# Patient Record
Sex: MALE | Race: ASIAN | NOT HISPANIC OR LATINO | Employment: FULL TIME | URBAN - METROPOLITAN AREA
[De-identification: names, ages, dates, MRNs, and addresses within clinical notes are randomized per-mention and may not be internally consistent; named-entity substitution may affect disease eponyms.]

---

## 2023-04-07 NOTE — TELEPHONE ENCOUNTER
Action    Action Taken Tried to reach patient to get urgent care information- Phone number not in service 23- [place]    Sent request to [facility] to see if they happen to have recs    PER [facility] Not their patient         MEDICAL RECORDS REQUEST   [facility]  Urology Clinic  [address] [place] [id number]  PHONE: [phone]  Fax: [phone]        FUTURE VISIT INFORMATION                                                   Ilan Hinds, : 1980 scheduled for future visit at [facility]    APPOINTMENT INFORMATION:    Date: 23    Provider:  Ana M Martinez    Reason for Visit/Diagnosis: testicular pain, scheduled per patient. Urgent Care clinic in [place] on 23      RECORDS REQUESTED FOR VISIT                                                     NOTES  STATUS/DETAILS   OFFICE NOTE from other specialist  in process   DISCHARGE SUMMARY from hospital  in process   DISCHARGE REPORT from the ER  in process   OPERATIVE REPORT  in process   MEDICATION LIST  in process   LABS     URINALYSIS (UA)  in process   URINE CYTOLOGY  in process     PRE-VISIT CHECKLIST      No- in process

## 2023-04-20 ENCOUNTER — PRE VISIT (OUTPATIENT)
Dept: UROLOGY | Facility: CLINIC | Age: 43
End: 2023-04-20
Payer: COMMERCIAL

## 2023-04-20 NOTE — PROGRESS NOTES
Subjective     REQUESTING PROVIDER  Self-referral    REASON FOR VISIT  Testicular pain    HISTORY OF PRESENT ILLNESS  Mr. Hinds is a pleasant 42 year old male with no significant past medical history who presents today for testicular pain.     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, congenital, no history of surgery on it, that is the painful side, no pain on left     Pain started a little over 10 years ago     Able to have children    Now would like to pursue surgery given increase in pain    No history of gross hematuria     No history of kidney stones    No history of retention    No history of UTIs or testicular infections    No history of genital/abdominal surgery    States that pain can be associated with urge to defecate, pain gets a bit better    Pain is made better by squatting    Has not tried any medications    REVIEW OF SYSTEMS  Review of Systems   Constitutional: Positive for fatigue. Negative for chills, diaphoresis and unexpected weight change.        No night sweats   HENT: Negative for ear pain and tinnitus.    Eyes: Negative for visual disturbance.   Respiratory: Negative for shortness of breath.    Cardiovascular: Negative for chest pain.   Gastrointestinal: Negative for abdominal pain, constipation and diarrhea.   Genitourinary: Negative for hematuria.   Musculoskeletal: Negative for back pain.   Neurological: Negative for dizziness and light-headedness.   Hematological: Negative for adenopathy.   Psychiatric/Behavioral: Negative for sleep disturbance.     Social History:  Very minimal, one cigarette when stressed  Occasional weed gummie    Family History:  Denies any known family history of urologic malignancy     Objective     PHYSICAL EXAM  Physical Exam  Constitutional:       Appearance: Normal appearance.   HENT:      Head: Normocephalic and atraumatic.      Right Ear: External ear normal.      Left Ear: External ear normal.       Nose: Nose normal.   Eyes:      General:         Right eye: No discharge.         Left eye: No discharge.   Pulmonary:      Effort: Pulmonary effort is normal. No respiratory distress.   Abdominal:      General: There is no distension.   Genitourinary:     Comments: Circumcised phallus without any evidence of meatal stenosis, penile plaque, or abnormal lesion.    Left testicle within the scrotum without any evidence of nodularity, induration, or tenderness.  Spermatic cord palpated with intact vas deferens and no evidence of varicocele.  Right testicle absent from the scrotum, nonpalpable in the inguinal canal.  Tenderness to palpation of the suprapubic area on the right-hand side.  No evidence of scars in the suprapubic area.  Musculoskeletal:         General: No deformity.   Neurological:      Mental Status: He is alert and oriented to person, place, and time.   Psychiatric:         Mood and Affect: Mood normal.       LABORATORY  No recent urine testing    IMAGING  No testicular ultrasound     Assessment & Plan   1. Right testicular pain  2. Right abdominal testicle    It was my pleasure to meet with Mr. Hinds in the office today in regards to his undistended right testicle and pain in that testicle.  After reviewing his clinical situation as well as his physical exam, I believe that it is warranted for us to get some updated imaging of his abdominal testicle as well as tumor markers.  We discussed that this is because undistended testicles are at an elevated risk for testicular cancer, and that we would need updated imaging before we could proceed with any discussion of surgery anyways.  I did stress that if his testicular pain on the right-hand side and his abdomen were to get worse, he should go to the emergency department for a sooner scan and to rule out testicular torsion within the abdomen.    Outside of this, we will plan to get a CT pelvis as well as AFP, hCG, and LDH.  We will plan to speak with him  virtually or in the office shortly after these was tests, as well as get him on the calendar with Dr. Barajas as he is very interested in completing surgery, specifically orchiectomy.    Mr. Hinds expressed understanding and agreement to the above discussion and plan and all of his questions were answered to his satisfaction. A business card was provided as a point of contact.    PLAN    First available pelvic CT with and without IV contrast as well as tumor markers with virtual visit or office visit shortly afterwards to discuss results    Schedule first available visit with Dr. Barajas for discussion of orchiectomy in the setting of abdominal testicle and testicular pain    Signed by:    Stoney Martinez PA-C

## 2023-04-20 NOTE — CONFIDENTIAL NOTE
Reason for visit: consult     Relevant information: testicular pain    Records/imaging/labs/orders: all in process, unable to reach pt for records    At Rooming: kai Campa  4/20/2023  1:47 PM

## 2023-04-24 ENCOUNTER — OFFICE VISIT (OUTPATIENT)
Dept: UROLOGY | Facility: CLINIC | Age: 43
End: 2023-04-24
Payer: COMMERCIAL

## 2023-04-24 ENCOUNTER — PRE VISIT (OUTPATIENT)
Dept: UROLOGY | Facility: CLINIC | Age: 43
End: 2023-04-24

## 2023-04-24 VITALS
HEART RATE: 84 BPM | DIASTOLIC BLOOD PRESSURE: 92 MMHG | SYSTOLIC BLOOD PRESSURE: 148 MMHG | BODY MASS INDEX: 25.18 KG/M2 | WEIGHT: 190 LBS | HEIGHT: 73 IN

## 2023-04-24 DIAGNOSIS — Q53.111 UNILATERAL INTRA-ABDOMINAL TESTIS: Primary | ICD-10-CM

## 2023-04-24 PROCEDURE — 99204 OFFICE O/P NEW MOD 45 MIN: CPT | Performed by: STUDENT IN AN ORGANIZED HEALTH CARE EDUCATION/TRAINING PROGRAM

## 2023-04-24 ASSESSMENT — ENCOUNTER SYMPTOMS
SLEEP DISTURBANCE: 0
ABDOMINAL PAIN: 0
SHORTNESS OF BREATH: 0
UNEXPECTED WEIGHT CHANGE: 0
LIGHT-HEADEDNESS: 0
HEMATURIA: 0
CONSTIPATION: 0
DIARRHEA: 0
BACK PAIN: 0
FATIGUE: 1
ADENOPATHY: 0
CHILLS: 0
DIZZINESS: 0
DIAPHORESIS: 0

## 2023-04-24 ASSESSMENT — PAIN SCALES - GENERAL: PAINLEVEL: MODERATE PAIN (5)

## 2023-04-24 NOTE — NURSING NOTE
"Chief Complaint   Patient presents with     Consult     Testicular pain       Blood pressure (!) 148/92, pulse 84, height 1.854 m (6' 1\"), weight 86.2 kg (190 lb). Body mass index is 25.07 kg/m .    Patient Active Problem List   Diagnosis     CARDIOVASCULAR SCREENING; LDL GOAL LESS THAN 160       No Known Allergies    Current Outpatient Medications   Medication Sig Dispense Refill     NO ACTIVE MEDICATIONS          Social History     Tobacco Use     Smoking status: Some Days     Types: Cigarettes   Substance Use Topics     Alcohol use: Yes     Alcohol/week: 3.3 - 4.2 standard drinks of alcohol     Types: 4 - 5 drink(s) per week     Drug use: No       Nanci Chau LPN  4/24/2023  9:48 AM  "

## 2023-04-24 NOTE — LETTER
4/24/2023       RE: Ilan Hinds  [address] [facility] [address]     Dear Colleague,    Thank you for referring your patient, Ilan Hinds, to the [facility] [place] at [facility]. Please see a copy of my visit note below.    Subjective     REQUESTING PROVIDER  Self-referral    REASON FOR VISIT  Testicular pain    HISTORY OF PRESENT ILLNESS  Mr. Hinds is a pleasant 42 year old male with no significant past medical history who presents today for testicular pain.     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, congenital, no history of surgery on it, that is the painful side, no pain on left   Pain started a little over 10 years ago   Able to have children  Now would like to pursue surgery given increase in pain  No history of gross hematuria   No history of kidney stones  No history of retention  No history of UTIs or testicular infections  No history of genital/abdominal surgery  States that pain can be associated with urge to defecate, pain gets a bit better  Pain is made better by squatting  Has not tried any medications    REVIEW OF SYSTEMS  Review of Systems   Constitutional: Positive for fatigue. Negative for chills, diaphoresis and unexpected weight change.        No night sweats   HENT: Negative for ear pain and tinnitus.    Eyes: Negative for visual disturbance.   Respiratory: Negative for shortness of breath.    Cardiovascular: Negative for chest pain.   Gastrointestinal: Negative for abdominal pain, constipation and diarrhea.   Genitourinary: Negative for hematuria.   Musculoskeletal: Negative for back pain.   Neurological: Negative for dizziness and light-headedness.   Hematological: Negative for adenopathy.   Psychiatric/Behavioral: Negative for sleep disturbance.     Social History:  Very minimal, one cigarette when stressed  Occasional weed gummie    Family History:  Denies any  known family history of urologic malignancy     Objective     PHYSICAL EXAM  Physical Exam  Constitutional:       Appearance: Normal appearance.   HENT:      Head: Normocephalic and atraumatic.      Right Ear: External ear normal.      Left Ear: External ear normal.      Nose: Nose normal.   Eyes:      General:         Right eye: No discharge.         Left eye: No discharge.   Pulmonary:      Effort: Pulmonary effort is normal. No respiratory distress.   Abdominal:      General: There is no distension.   Genitourinary:     Comments: Circumcised phallus without any evidence of meatal stenosis, penile plaque, or abnormal lesion.    Left testicle within the scrotum without any evidence of nodularity, induration, or tenderness.  Spermatic cord palpated with intact vas deferens and no evidence of varicocele.  Right testicle absent from the scrotum, nonpalpable in the inguinal canal.  Tenderness to palpation of the suprapubic area on the right-hand side.  No evidence of scars in the suprapubic area.  Musculoskeletal:         General: No deformity.   Neurological:      Mental Status: He is alert and oriented to person, place, and time.   Psychiatric:         Mood and Affect: Mood normal.       LABORATORY  No recent urine testing    IMAGING  No testicular ultrasound     Assessment & Plan   Right testicular pain  Right abdominal testicle    It was my pleasure to meet with Mr. Hinds in the office today in regards to his undistended right testicle and pain in that testicle.  After reviewing his clinical situation as well as his physical exam, I believe that it is warranted for us to get some updated imaging of his abdominal testicle as well as tumor markers.  We discussed that this is because undistended testicles are at an elevated risk for testicular cancer, and that we would need updated imaging before we could proceed with any discussion of surgery anyways.  I did stress that if his testicular pain on the right-hand side and  his abdomen were to get worse, he should go to the emergency department for a sooner scan and to rule out testicular torsion within the abdomen.    Outside of this, we will plan to get a CT pelvis as well as AFP, hCG, and LDH.  We will plan to speak with him virtually or in the office shortly after these was tests, as well as get him on the calendar with Dr. Barajas as he is very interested in completing surgery, specifically orchiectomy.    Mr. Hinds expressed understanding and agreement to the above discussion and plan and all of his questions were answered to his satisfaction. A business card was provided as a point of contact.    PLAN  First available pelvic CT with and without IV contrast as well as tumor markers with virtual visit or office visit shortly afterwards to discuss results  Schedule first available visit with Dr. Barajas for discussion of orchiectomy in the setting of abdominal testicle and testicular pain    Signed by:    Stoney Martinez PA-C

## 2023-04-26 ENCOUNTER — LAB (OUTPATIENT)
Dept: LAB | Facility: CLINIC | Age: 43
End: 2023-04-26
Attending: STUDENT IN AN ORGANIZED HEALTH CARE EDUCATION/TRAINING PROGRAM
Payer: COMMERCIAL

## 2023-04-26 ENCOUNTER — HOSPITAL ENCOUNTER (OUTPATIENT)
Dept: CT IMAGING | Facility: CLINIC | Age: 43
Discharge: HOME | End: 2023-04-26
Attending: STUDENT IN AN ORGANIZED HEALTH CARE EDUCATION/TRAINING PROGRAM
Payer: COMMERCIAL

## 2023-04-26 DIAGNOSIS — Q53.111 UNILATERAL INTRA-ABDOMINAL TESTIS: ICD-10-CM

## 2023-04-26 LAB
AFP SERPL-MCNC: 3.4 NG/ML
LDH SERPL L TO P-CCNC: 108 U/L (ref 0–250)

## 2023-04-26 PROCEDURE — 72193 CT PELVIS W/DYE: CPT

## 2023-04-26 PROCEDURE — 83615 LACTATE (LD) (LDH) ENZYME: CPT

## 2023-04-26 PROCEDURE — 250N000009 HC RX 250: Performed by: STUDENT IN AN ORGANIZED HEALTH CARE EDUCATION/TRAINING PROGRAM

## 2023-04-26 PROCEDURE — 82105 ALPHA-FETOPROTEIN SERUM: CPT

## 2023-04-26 PROCEDURE — 36415 COLL VENOUS BLD VENIPUNCTURE: CPT

## 2023-04-26 PROCEDURE — 84702 CHORIONIC GONADOTROPIN TEST: CPT

## 2023-04-26 PROCEDURE — 250N000011 HC RX IP 250 OP 636: Performed by: STUDENT IN AN ORGANIZED HEALTH CARE EDUCATION/TRAINING PROGRAM

## 2023-04-26 RX ORDER — IOPAMIDOL 755 MG/ML
93 INJECTION, SOLUTION INTRAVASCULAR ONCE
Status: COMPLETED | OUTPATIENT
Start: 2023-04-26 | End: 2023-04-26

## 2023-04-26 RX ADMIN — SODIUM CHLORIDE 67 ML: 9 INJECTION, SOLUTION INTRAVENOUS at 13:34

## 2023-04-26 RX ADMIN — IOPAMIDOL 93 ML: 755 INJECTION, SOLUTION INTRAVENOUS at 13:33

## 2023-04-27 LAB — HCG-TM SERPL-ACNC: <3 IU/L

## 2023-05-10 ENCOUNTER — PRE VISIT (OUTPATIENT)
Dept: UROLOGY | Facility: CLINIC | Age: 43
End: 2023-05-10
Payer: COMMERCIAL

## 2023-05-10 NOTE — TELEPHONE ENCOUNTER
Reason for visit: Follow up    Relevant information: Discuss test results.    Records/imaging/labs/orders: Epic    At Rooming: Video    Nikole Cheung  5/10/2023  1:30 PM

## 2023-05-11 NOTE — PROGRESS NOTES
Consult conducted via real-time audio/video technology by Stoney Martinez PA-C from [facility] to the patient in their home. Patient consented to this billed visit that was started at 3:02 PM and completed at 3:11 PM.    REASON FOR VISIT  Imaging review    HISTORY OF PRESENT ILLNESS  Mr. Hinds is a 42 year old male who I am speaking with today in follow-up for his right sided inguinal testicular pain.  After meeting him in the office on 4/24/2023, we discussed getting a CT pelvis with and without IV contrast to determine where his nonscrotal right testicle was as it was not clear if it was in the abdomen or the inguinal canal.    PHYSICAL EXAMINATION  Deferred given virtual visit.    IMAGING  I personally reviewed and interpreted the below CT pelvis with and without IV contrast from 4/26/2023    IMPRESSION:   1.  Left testicle within the scrotal sac. Right testicle probably  within the inferior aspect of the right inguinal canal. Ultrasound  could be considered for further evaluation if indicated. No other  acute findings within the pelvis.    IMPRESSION  1. Inguinal testicle    It was my pleasure to speak with Ilan kowalski in follow-up for his CT results and lab work.  I was happy to let him know that the lab work does not indicate any concern for testicular cancer, and the CT results did confirm that the testicle itself is in the inguinal canal.  With this in mind, we discussed that given his pain and the location of the testicle that he would benefit most from a unilateral orchiectomy as generally speaking there is not enough tissue to bring that testicle down into the scrotum.  We discussed the risks and benefits of this procedure including the risks of anesthesia, risk of bleeding, and risk of damage to surrounding structures, and risk of infection.  We discussed that he likely would need to take upwards of 4 to 6 weeks off of work or at least have very heavy restrictions given that his job  is primarily physical in nature.  Finally, we discussed that neck steps would either include having him see Dr. Cool in clinic versus being added to his surgical calendar immediately.  I will discuss the case with him on Monday and contact Ilan with next steps.    Mr. Hinds expressed understanding and agreement to the above discussion and plan and all of his questions were answered to his satisfaction.      PLAN    Unilateral orchiectomy for inguinal right testicle.  Will discuss case with Dr. Cool    SIGNED    Stoney Martinez PA-C      I spent a total of 15 minutes spent on the date of the encounter doing chart review, history and exam, documentation, and further activities as noted above.

## 2023-05-12 ENCOUNTER — VIRTUAL VISIT (OUTPATIENT)
Dept: UROLOGY | Facility: CLINIC | Age: 43
End: 2023-05-12
Payer: COMMERCIAL

## 2023-05-12 DIAGNOSIS — Q53.112 UNILATERAL INGUINAL TESTIS: Primary | ICD-10-CM

## 2023-05-12 PROCEDURE — 99213 OFFICE O/P EST LOW 20 MIN: CPT | Mod: VID | Performed by: STUDENT IN AN ORGANIZED HEALTH CARE EDUCATION/TRAINING PROGRAM

## 2023-05-12 NOTE — LETTER
5/12/2023       RE: Ilan Hinds  [address] [facility] [address]     Dear Colleague,    Thank you for referring your patient, Ilan Hinds, to the [facility] [place] at . Please see a copy of my visit note below.    Consult conducted via real-time audio/video technology by Stoney Martinez PA-C from [facility] to the patient in their home. Patient consented to this billed visit that was started at 3:02 PM and completed at 3:11 PM.    REASON FOR VISIT  Imaging review    HISTORY OF PRESENT ILLNESS  Mr. Hinds is a 42 year old male who I am speaking with today in follow-up for his right sided inguinal testicular pain.  After meeting him in the office on 4/24/2023, we discussed getting a CT pelvis with and without IV contrast to determine where his nonscrotal right testicle was as it was not clear if it was in the abdomen or the inguinal canal.    PHYSICAL EXAMINATION  Deferred given virtual visit.    IMAGING  I personally reviewed and interpreted the below CT pelvis with and without IV contrast from 4/26/2023    IMPRESSION:   1.  Left testicle within the scrotal sac. Right testicle probably  within the inferior aspect of the right inguinal canal. Ultrasound  could be considered for further evaluation if indicated. No other  acute findings within the pelvis.    IMPRESSION  Inguinal testicle    It was my pleasure to speak with Ilan kowalski in follow-up for his CT results and lab work.  I was happy to let him know that the lab work does not indicate any concern for testicular cancer, and the CT results did confirm that the testicle itself is in the inguinal canal.  With this in mind, we discussed that given his pain and the location of the testicle that he would benefit most from a unilateral orchiectomy as generally speaking there is not enough tissue to bring that testicle down into the scrotum.  We discussed the risks  and benefits of this procedure including the risks of anesthesia, risk of bleeding, and risk of damage to surrounding structures, and risk of infection.  We discussed that he likely would need to take upwards of 4 to 6 weeks off of work or at least have very heavy restrictions given that his job is primarily physical in nature.  Finally, we discussed that neck steps would either include having him see Dr. Cool in clinic versus being added to his surgical calendar immediately.  I will discuss the case with him on Monday and contact Ilan with next steps.    Mr. Hinds expressed understanding and agreement to the above discussion and plan and all of his questions were answered to his satisfaction.      PLAN  Unilateral orchiectomy for inguinal right testicle.  Will discuss case with Dr. Cool    SIGNED    Stoney Martinez PA-C      I spent a total of 15 minutes spent on the date of the encounter doing chart review, history and exam, documentation, and further activities as noted above.

## 2023-05-17 ENCOUNTER — MYC MEDICAL ADVICE (OUTPATIENT)
Dept: UROLOGY | Facility: CLINIC | Age: 43
End: 2023-05-17
Payer: COMMERCIAL

## 2023-05-17 NOTE — TELEPHONE ENCOUNTER
Elijah/Care coordinators, can you please assist me in reaching out to Mr. Hinds and get him scheduled with Dr. Barajas or Dr. Cool first available? Patient will likely be willing to go to any location for this visit.     Thank you!

## 2023-06-03 ENCOUNTER — HEALTH MAINTENANCE LETTER (OUTPATIENT)
Age: 43
End: 2023-06-03

## 2023-06-06 ENCOUNTER — MYC MEDICAL ADVICE (OUTPATIENT)
Dept: UROLOGY | Facility: CLINIC | Age: 43
End: 2023-06-06
Payer: COMMERCIAL

## 2023-06-08 NOTE — TELEPHONE ENCOUNTER
Tadeo Salazar,     This paperwork would be better completed by his primary care as I am not managing his back and leg pain.     Thank you for checking in with me!    Best,    Stoney Martinez PA-C

## 2023-06-22 ENCOUNTER — PRE VISIT (OUTPATIENT)
Dept: UROLOGY | Facility: CLINIC | Age: 43
End: 2023-06-22
Payer: COMMERCIAL

## 2023-06-22 NOTE — CONFIDENTIAL NOTE
Reason for visit: physical exam for possible surger y    Relevant information: unilateral inguinal testis    Records/imaging/labs/orders: in epic    At Rooming: kai Campa  6/22/2023  2:32 PM

## 2023-06-29 ENCOUNTER — OFFICE VISIT (OUTPATIENT)
Dept: UROLOGY | Facility: CLINIC | Age: 43
End: 2023-06-29
Payer: COMMERCIAL

## 2023-06-29 VITALS
WEIGHT: 190 LBS | BODY MASS INDEX: 25.18 KG/M2 | HEART RATE: 76 BPM | DIASTOLIC BLOOD PRESSURE: 82 MMHG | SYSTOLIC BLOOD PRESSURE: 119 MMHG | HEIGHT: 73 IN

## 2023-06-29 DIAGNOSIS — Q53.10 UNDESCENDED RIGHT TESTICLE: Primary | ICD-10-CM

## 2023-06-29 DIAGNOSIS — N50.811 PAIN IN RIGHT TESTICLE: Primary | ICD-10-CM

## 2023-06-29 DIAGNOSIS — N50.811 PAIN IN RIGHT TESTICLE: ICD-10-CM

## 2023-06-29 PROCEDURE — 99204 OFFICE O/P NEW MOD 45 MIN: CPT | Performed by: UROLOGY

## 2023-06-29 RX ORDER — MELOXICAM 15 MG/1
15 TABLET ORAL DAILY
Qty: 30 TABLET | Refills: 1 | Status: SHIPPED | OUTPATIENT
Start: 2023-06-29 | End: 2023-07-26

## 2023-06-29 RX ORDER — CEFAZOLIN SODIUM 2 G/50ML
2 SOLUTION INTRAVENOUS
Status: CANCELLED | OUTPATIENT
Start: 2023-06-29

## 2023-06-29 RX ORDER — CEFAZOLIN SODIUM 2 G/50ML
2 SOLUTION INTRAVENOUS SEE ADMIN INSTRUCTIONS
Status: CANCELLED | OUTPATIENT
Start: 2023-06-29

## 2023-06-29 ASSESSMENT — PAIN SCALES - GENERAL: PAINLEVEL: SEVERE PAIN (6)

## 2023-06-29 NOTE — PROGRESS NOTES
"HPI:  Ilan Hinds is a 42 year old male being seen for follow-up right testis pain from inguinal testis- referral from FARRUKH Martinez.    Cesario 43yo M with 4 months of painful right testis in the inguinal canal. He states he never paid attention to see if the testicle was ever in the scrotum when he was younger.  Unclear etiology of why the testicle is in the canal at this time.    I reviewed his CT abdomen/pelvis from 4/26/23 that shows normal looking testis at or above the external inguinal ring.  This is palpable on exam but is very tender to palpation.  Pt is bothered constantly with discomfort in the right testis.  He has 3 children, conceived easily, desires no future fertility.  He would like the testis removed due to severe discomfort.      He is otherwise healthy.  No chronic medical problems  Has been taking tyl and ibuprofen for pain,but ibuprofen is causing stomach upset after the last several weeks.    Exam:  Physical Exam  /82   Pulse 76   Ht 1.854 m (6' 1\")   Wt 86.2 kg (190 lb)   BMI 25.07 kg/m      General: Alert, oriented, nad.  Pleasant and conversant.  Eyes: anicteric, EOMI.  Pulse: regular  Resps: normal, non-labored.  Abdomen:  Nondistended.  Neurological - no tremors  Skin - no discoloration/ lesions noted   exam   Phallus *normal   Left testis +, anodular, nontender.  Normal volume about 15cc.  Right testis is palpable in the right inguinal canal, moderately tender to palpation.  Vas and epididymis present and normal bilaterally  No varicocele noted.  JEFF deferred.     Review of Imaging:  The following imaging exams were independently viewed and interpreted by me today:  CT as listed above.    Review of Labs:  The following labs were reviewed today:  Component      Latest Ref Rng 4/26/2023  1:23 PM   Beta-HCG Tumor Marker      <5 IU/L <3    Lactate Dehydrogenase      0 - 250 U/L 108    AFP tumor marker      <=8.3 ng/mL 3.4        Assessment & Plan   1. Right inguinal canal " "testis causing daily pain.  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  Discussed with him that probably laparoscopic approach would be the least invasive, otherwise we'd have to open his inguinal canal and this would be a little longer recovery for his physical job.  We could try under anesthesia to see if his testis is just retractile, and amenable to orchidopexy, but pt did not tolerate manipulation to try to \"milk\" testis down to scrotum today in clinic.  Testis tumor markers negative.    a. Prescription for meloxicam which might be a little easier on his stomach.  b. Advised stop ibuprofen but continue apap.  2. Spoke with Dr. Cool who is amenable to placing surgery orders for right laparoscopic orch.    PRN follow-up with Dr. Karl Barajas MD  [facility] [place]      ==========================  Additional Coding Information:    Problems:  3 -- one acute uncomplicated illness or injury    Data Reviewed  3 or more studies reviewed, as listed above     Tests ordered: N/A     Level of risk:  4 -- prescription drug management    Time spent:  25 minutes spent by me on the date of the encounter doing chart review, history and exam, documentation and further activities per the note          "

## 2023-06-29 NOTE — NURSING NOTE
"Chief Complaint   Patient presents with     Follow Up     Discuss surgery       Blood pressure 119/82, pulse 76, height 1.854 m (6' 1\"), weight 86.2 kg (190 lb). Body mass index is 25.07 kg/m .    Patient Active Problem List   Diagnosis     CARDIOVASCULAR SCREENING; LDL GOAL LESS THAN 160     Unilateral inguinal testis       No Known Allergies    Current Outpatient Medications   Medication Sig Dispense Refill     Acetaminophen (TYLENOL PO)        IBUPROFEN PO        NO ACTIVE MEDICATIONS          Social History     Tobacco Use     Smoking status: Former     Types: Cigarettes     Quit date: 2023     Years since quittin.0   Substance Use Topics     Alcohol use: Yes     Alcohol/week: 3.3 - 4.2 standard drinks of alcohol     Types: 4 - 5 drink(s) per week     Drug use: No       Ligia Campa  2023  10:43 AM  "

## 2023-06-29 NOTE — LETTER
"6/29/2023       RE: Ilan Hinds  [address]  [facility] [address]     Dear Colleague,    Thank you for referring your patient, Ilan Hinds, to the [facility]OLOGY [facility] [place] [facility]. Please see a copy of my visit note below.    HPI:  Ilan Hinds is a 42 year old male being seen for follow-up right testis pain from inguinal testis- referral from FARRUKH Martinez.    Pleasant 41yo M with 4 months of painful right testis in the inguinal canal. He states he never paid attention to see if the testicle was ever in the scrotum when he was younger.  Unclear etiology of why the testicle is in the canal at this time.    I reviewed his CT abdomen/pelvis from 4/26/23 that shows normal looking testis at or above the external inguinal ring.  This is palpable on exam but is very tender to palpation.  Pt is bothered constantly with discomfort in the right testis.  He has 3 children, conceived easily, desires no future fertility.  He would like the testis removed due to severe discomfort.      He is otherwise healthy.  No chronic medical problems  Has been taking tyl and ibuprofen for pain,but ibuprofen is causing stomach upset after the last several weeks.    Exam:  Physical Exam  /82   Pulse 76   Ht 1.854 m (6' 1\")   Wt 86.2 kg (190 lb)   BMI 25.07 kg/m      General: Alert, oriented, nad.  Pleasant and conversant.  Eyes: anicteric, EOMI.  Pulse: regular  Resps: normal, non-labored.  Abdomen:  Nondistended.  Neurological - no tremors  Skin - no discoloration/ lesions noted   exam   Phallus *normal   Left testis +, anodular, nontender.  Normal volume about 15cc.  Right testis is palpable in the right inguinal canal, moderately tender to palpation.  Vas and epididymis present and normal bilaterally  No varicocele noted.  JEFF deferred.     Review of Imaging:  The following imaging exams were independently viewed and interpreted by me today:  CT as " "listed above.    Review of Labs:  The following labs were reviewed today:  Component      Latest Ref Rng 4/26/2023  1:23 PM   Beta-HCG Tumor Marker      <5 IU/L <3    Lactate Dehydrogenase      0 - 250 U/L 108    AFP tumor marker      <=8.3 ng/mL 3.4        Assessment & Plan   Right inguinal canal testis causing daily pain.  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  Discussed with him that probably laparoscopic approach would be the least invasive, otherwise we'd have to open his inguinal canal and this would be a little longer recovery for his physical job.  We could try under anesthesia to see if his testis is just retractile, and amenable to orchidopexy, but pt did not tolerate manipulation to try to \"milk\" testis down to scrotum today in clinic.  Testis tumor markers negative.    Prescription for meloxicam which might be a little easier on his stomach.  Advised stop ibuprofen but continue apap.  Spoke with Dr. Cool who is amenable to placing surgery orders for right laparoscopic orch.    PRN follow-up with Dr. Karl Barajas MD  [facility] UROLOGY [facility] [place]      ==========================  Additional Coding Information:    Problems:  3 -- one acute uncomplicated illness or injury    Data Reviewed  3 or more studies reviewed, as listed above     Tests ordered: N/A     Level of risk:  4 -- prescription drug management    Time spent:  25 minutes spent by me on the date of the encounter doing chart review, history and exam, documentation and further activities per the note            el  "

## 2023-07-13 ENCOUNTER — TELEPHONE (OUTPATIENT)
Dept: UROLOGY | Facility: CLINIC | Age: 43
End: 2023-07-13
Payer: COMMERCIAL

## 2023-07-13 DIAGNOSIS — Q53.10 UNDESCENDED RIGHT TESTICLE: Primary | ICD-10-CM

## 2023-07-13 NOTE — TELEPHONE ENCOUNTER
Left patient a voicemail to schedule Laparoscopic, Possible Open -  Right Orchiectomy (N/A)  with Dr. Silvina Souza on 7/13/2023 at 3:18 PM

## 2023-07-14 PROBLEM — Q53.10 UNDESCENDED RIGHT TESTICLE: Status: ACTIVE | Noted: 2023-06-29

## 2023-07-14 PROBLEM — N50.811 PAIN IN RIGHT TESTICLE: Status: ACTIVE | Noted: 2023-06-29

## 2023-07-14 NOTE — TELEPHONE ENCOUNTER
Left patient another voicemail to schedule Laparoscopic, Possible Open -  Right Orchiectomy (N/A)  with Dr. Silvina Souza, Perioperative Coordinator, ENT 7/14/2023 at 10:43 AM

## 2023-07-14 NOTE — TELEPHONE ENCOUNTER
Left patient another voicemail to schedule Laparoscopic, Possible Open -  Right Orchiectomy (N/A)  with Dr. Silvina Souza, Perioperative Coordinator, ENT 7/14/2023 at 8:31 AM

## 2023-07-14 NOTE — TELEPHONE ENCOUNTER
Patient was contacted & scheduled surgery with Dr. Cool on 8/11    Surgery is located at [facility]    Patient will be seen for their H&P by Stoney Martinez PA-C within 30 days of surgery    Patient confirmed their PCP on file is up to date    Anesthesia type: general      Requested Imaging required for surgery: NA    Patient is scheduled for their 2-3 weeks post op virtual on 8/23 at 130p    Patient received their packet via mail per their preference    Additional comments: RADHA Souza on 7/14/2023 at 1:21 PM

## 2023-07-21 NOTE — TELEPHONE ENCOUNTER
FUTURE VISIT INFORMATION      SURGERY INFORMATION:    Date:8/11/23    [facility]    Surgeon:  Bret Cool MD    Anesthesia Type:  general    Procedure: Laparoscopic, Possible Open -  Right Orchiectomy    RECORDS REQUESTED FROM:       Primary Care Provider: Stoney Martinez PA-C

## 2023-07-26 ENCOUNTER — PRE VISIT (OUTPATIENT)
Dept: SURGERY | Facility: CLINIC | Age: 43
End: 2023-07-26

## 2023-07-26 ENCOUNTER — VIRTUAL VISIT (OUTPATIENT)
Dept: SURGERY | Facility: CLINIC | Age: 43
End: 2023-07-26
Payer: COMMERCIAL

## 2023-07-26 ENCOUNTER — ANESTHESIA EVENT (OUTPATIENT)
Dept: SURGERY | Facility: AMBULATORY SURGERY CENTER | Age: 43
End: 2023-07-26

## 2023-07-26 ENCOUNTER — TELEPHONE (OUTPATIENT)
Dept: SURGERY | Facility: CLINIC | Age: 43
End: 2023-07-26

## 2023-07-26 DIAGNOSIS — Z01.818 PREOP EXAMINATION: Primary | ICD-10-CM

## 2023-07-26 DIAGNOSIS — N50.811 PAIN IN RIGHT TESTICLE: ICD-10-CM

## 2023-07-26 DIAGNOSIS — Q53.10 UNDESCENDED RIGHT TESTICLE: ICD-10-CM

## 2023-07-26 PROCEDURE — 99203 OFFICE O/P NEW LOW 30 MIN: CPT | Mod: VID | Performed by: PHYSICIAN ASSISTANT

## 2023-07-26 ASSESSMENT — PAIN SCALES - GENERAL: PAINLEVEL: NO PAIN (0)

## 2023-07-26 ASSESSMENT — ENCOUNTER SYMPTOMS: SEIZURES: 0

## 2023-07-26 ASSESSMENT — LIFESTYLE VARIABLES: TOBACCO_USE: 1

## 2023-07-26 NOTE — PATIENT INSTRUCTIONS
Preparing for Your Surgery      Name:  Ilan Hinds   MRN:  [id number]   :  1980   Today's Date:  2023       Arriving for surgery:  Surgery date:  23  Arrival time:  12:00 PM      Please come to:     [facility] [address] [address]     Parking is available in front of the [facility] building from 5:30AM to 8:00PM.  -  Proceed to the 5th floor to check into the Ambulatory Surgery Center.              >> There will be patient concierges on the 1st and 5th floor, for assistance or an escort, if you would like.              >> Please call [phone] with any questions.    What can I eat or drink?  -  You may eat and drink normally up to 8 hours prior to arrival time.   -  You may have clear liquids until 2 hours prior to arrival time.     Examples of clear liquids:  Water  Clear broth  Juices (apple, white grape, white cranberry  and cider) without pulp  Noncarbonated, powder based beverages  (lemonade and Sergey-Aid)  Sodas (Sprite, 7-Up, ginger ale and seltzer)  Coffee or tea (without milk or cream)  Gatorade    -  No Alcohol or cannabis products for at least 24 hours before surgery.     Which medicines can I take?    Hold Aspirin for 7 days before surgery.   Hold Multivitamins for 7 days before surgery.  Hold Supplements for 7 days before surgery.  Hold Ibuprofen (Advil, Motrin) for 1 day(s) before surgery--unless otherwise directed by surgeon.  Hold Naproxen (Aleve) for 4 days before surgery.      -  PLEASE TAKE these medications the day of surgery:  Tylenol if needed.    How do I prepare myself?  - Please take 2 showers (one the night prior to surgery and one the morning of surgery) using Scrubcare or Hibiclens soap.    Use this soap only from the neck to your toes.     Leave the soap on your skin for one minute--then rinse thoroughly.      You may use your own shampoo and conditioner. No other hair  products.   - Please remove all jewelry and body piercings.  - No lotions, deodorants or fragrance.  - No makeup or fingernail polish.   - Bring your ID and insurance card.    -If you have a Deep Brain Stimulator, Spinal Cord Stimulator, or any Neuro Stimulator device---you must bring the remote control to the hospital.      ALL PATIENTS GOING HOME THE SAME DAY OF SURGERY ARE REQUIRED TO HAVE A RESPONSIBLE ADULT TO DRIVE AND BE IN ATTENDANCE WITH THEM FOR 24 HOURS FOLLOWING SURGERY.    Covid testing policy as of 12/06/2022  Your surgeon will notify and schedule you for a COVID test if one is needed before surgery--please direct any questions or COVID symptoms to your surgeon      Questions or Concerns:    - For any questions regarding the day of surgery or your hospital stay, please contact the Pre Admission Nursing Office at [phone].       - If you have health changes between today and your surgery, please call your surgeon.       - For questions after surgery, please call your surgeons office.           Current Visitor Guidelines    You may have 2 visitors in the pre op area.    Visiting hours: 8 a.m. to 8:30 p.m.    You may have four visitors during your inpatient hospital stay.    Patients confirmed or suspected to have symptoms of COVID 19 or flu:     No visitors allowed for adult patients.   Children (under age 18) can have 1 named visitor.     People who are sick or showing symptoms of COVID 19 or flu:    Are not allowed to visit patients--we can only make exceptions in special situations.       Please follow these guidelines for your visit:          Please maintain social distance          Masking is optional--however at times you may be asked to wear a mask for the safety of yourself and others     Clean your hands with alcohol hand . Do this when you arrive at and leave the building and patient room,    And again after you touch your mask or anything in the room.     Go directly to and from  the room you are visiting.     Stay in the patient s room during your visit. Limit going to other places in the hospital as much as possible     Leave bags and jackets at home or in the car.     For everyone s health, please don t come and go during your visit. That includes for smoking   during your visit.

## 2023-07-26 NOTE — H&P
Pre-Operative H & P     CC:  Preoperative exam to assess for increased cardiopulmonary risk while undergoing surgery and anesthesia.    Date of Encounter: 7/26/2023  Primary Care Physician:  Stoney Martinez     Reason for visit:   Encounter Diagnoses   Name Primary?     Undescended right testicle Yes     Pain in right testicle      Preop examination        HPI  Ilan Hinds is a 42 year old male who presents for pre-operative H & P in preparation for  Procedure Information     Case: [id number] Date/Time: 08/11/23 1330    Procedure: Laparoscopic, Possible Open -  Right Orchiectomy (Scrotum)    Anesthesia type: General    Diagnosis:       Undescended right testicle [Q53.10]      Pain in right testicle [N50.811]    Pre-op diagnosis:       Undescended right testicle [Q53.10]      Pain in right testicle [N50.811]    Location: [place] [address] / [facility]    Providers: Bret Cool MD            Mr. Hinds has a 4 month history of painful right testis in the inguinal canal.  CT abdomen/pelvis from 4/26/23 shows normal looking testis at or above the external inguinal ring. It is palpable on exam but is very tender to palpation. Pt is bothered constantly with discomfort in the right testis.  He has 3 children, conceived easily, desires no future fertility.  He would like the testis removed due to severe discomfort.  He now presents for the above procedure.    PMH is otherwise unremarkable.    History is obtained from the patient and chart review    Hx of abnormal bleeding or anti-platelet use: denies      Past Medical History  Past Medical History:   Diagnosis Date     Undescended right testicle      Varicose vein of leg     Rt leg       Past Surgical History  History reviewed. No pertinent surgical history.    Prior to Admission Medications  Current Outpatient Medications   Medication Sig Dispense Refill     Acetaminophen (TYLENOL PO) Take by mouth every 6 hours as needed        IBUPROFEN PO  (Patient not taking: Reported on 2023)       NO ACTIVE MEDICATIONS          Allergies  No Known Allergies    Social History  Social History     Socioeconomic History     Marital status:      Spouse name: Not on file     Number of children: 0     Years of education: Not on file     Highest education level: Not on file   Occupational History     Not on file   Tobacco Use     Smoking status: Former     Types: Cigarettes     Quit date: 2023     Years since quittin.1     Smokeless tobacco: Not on file   Substance and Sexual Activity     Alcohol use: Not Currently     Alcohol/week: 3.3 - 4.2 standard drinks of alcohol     Types: 4 - 5 drink(s) per week     Drug use: No     Sexual activity: Yes     Partners: Female   Other Topics Concern     Not on file   Social History Narrative     Not on file     Social Determinants of Health     Financial Resource Strain: Not on file   Food Insecurity: Not on file   Transportation Needs: Not on file   Physical Activity: Not on file   Stress: Not on file   Social Connections: Not on file   Intimate Partner Violence: Not on file   Housing Stability: Not on file       Family History  Family History   Problem Relation Age of Onset     Anesthesia Reaction No family hx of      Deep Vein Thrombosis (DVT) No family hx of        Review of Systems  The complete review of systems is negative other than noted in the HPI or here.     Anesthesia Evaluation   Pt has not had prior anesthetic         ROS/MED HX  ENT/Pulmonary:     (+) tobacco use, Past use,  (-) asthma and sleep apnea   Neurologic:  - neg neurologic ROS  (-) no seizures and no CVA   Cardiovascular:  - neg cardiovascular ROS     METS/Exercise Tolerance: >4 METS    Hematologic:  - neg hematologic  ROS  (-) history of blood clots and history of blood transfusion   Musculoskeletal:  - neg musculoskeletal ROS     GI/Hepatic:  - neg GI/hepatic ROS  (-) GERD and liver disease   Renal/Genitourinary: Comment:  Undescended right testicle     (-) renal disease   Endo:  - neg endo ROS  (-) Type II DM   Psychiatric/Substance Use:  - neg psychiatric ROS     Infectious Disease:  - neg infectious disease ROS     Malignancy:  - neg malignancy ROS     Other:  - neg other ROS          Virtual visit -  No vitals were obtained    Physical Exam  Constitutional: Awake, alert, cooperative, no apparent distress, and appears stated age.  HENT: Normocephalic  Respiratory: non labored breathing   Neurologic: Awake, alert, oriented to name, place and time.   Neuropsychiatric: Calm, cooperative. Normal affect.      Prior Labs/Diagnostic Studies   All labs and imaging personally reviewed       The patient's records and results personally reviewed by this provider.     Preop labs ordered:  BMP, CBC      Assessment      Ilan Hinds is a 42 year old male seen as a PAC referral for risk assessment and optimization for anesthesia.    Plan/Recommendations  Pt will be optimized for the proposed procedure.  See below for details on the assessment, risk, and preoperative recommendations    NEUROLOGY  - No history of TIA, CVA or seizure    -Post Op delirium risk factors:  No risk identified    ENT  - No current airway concerns.  Will need to be reassessed day of surgery.  Mallampati: Unable to assess  TM: Unable to assess    CARDIAC  - No history of CAD, Hypertension and Afib  - METS (Metabolic Equivalents)  Patient performs 4 or more METS exercise without symptoms            Total Score: 0      RCRI-Very low risk: Class 1 0.4% complication rate            Total Score: 0        PULMONARY  JEREMÍAS Low Risk            Total Score: 1    JEREMÍAS: Male      - Denies asthma or inhaler use  - Tobacco History      History   Smoking Status     Former     Types: Cigarettes     Quit date: 6/19/2023   Smokeless Tobacco     Not on file       GI  - Denies GERD  PONV Medium Risk  Total Score: 2           1 AN PONV: Patient is not a current smoker    1 AN PONV: Intended Post Op  "Opioids        /RENAL  - Undescended right testicle, symptomatic      ENDOCRINE    - BMI: Estimated body mass index is 25.07 kg/m  as calculated from the following:    Height as of 6/29/23: 1.854 m (6' 1\").    Weight as of 6/29/23: 86.2 kg (190 lb).  Healthy Weight (BMI 18.5-24.9)  - No history of Diabetes Mellitus    HEME  VTE Low Risk 0.5%            Total Score: 2    VTE: Male      - No history of abnormal bleeding or antiplatelet use.      The patient is optimized for their procedure. AVS with information on surgery time/arrival time, meds and NPO status given by nursing staff. No further diagnostic testing indicated.    Please refer to the physical examination documented by the anesthesiologist in the anesthesia record on the day of surgery.    Video-Visit Details    Type of service:  Video Visit    Provider received verbal consent for a Video Visit from the patient? Yes     Originating Location (pt. Location): Home    Distant Location (provider location):  Off-site  Mode of Communication:  Video Conference via [employer]  On the day of service:     Prep time: 11 minutes  Visit time: 8 minutes  Documentation time: 9 minutes  ------------------------------------------  Total time: 28 minutes      Anabel Caraballo PA-C  Preoperative Assessment Center  [facility]  Clinic and Surgery Center  Phone: [phone]  Fax: [phone]  "

## 2023-07-26 NOTE — TELEPHONE ENCOUNTER
Ericka Talavera he's scheduled for a lab appointment at the [facility] location on July 27th at 11:45 am.  Ilan expressed understanding.  Kasandra Pang RN

## 2023-07-26 NOTE — PROGRESS NOTES
Ilan is a 42 year old who is being evaluated via a billable video visit.      How would you like to obtain your AVS? MyChart    HPI           Review of Systems         Physical Exam

## 2023-07-27 ENCOUNTER — LAB (OUTPATIENT)
Dept: LAB | Facility: CLINIC | Age: 43
End: 2023-07-27
Payer: COMMERCIAL

## 2023-07-27 DIAGNOSIS — Q53.10 UNDESCENDED RIGHT TESTICLE: ICD-10-CM

## 2023-07-27 DIAGNOSIS — Z01.818 PREOP EXAMINATION: ICD-10-CM

## 2023-07-27 DIAGNOSIS — N50.811 PAIN IN RIGHT TESTICLE: ICD-10-CM

## 2023-07-27 LAB
ANION GAP SERPL CALCULATED.3IONS-SCNC: 9 MMOL/L (ref 7–15)
BASOPHILS # BLD AUTO: 0.1 10E3/UL (ref 0–0.2)
BASOPHILS NFR BLD AUTO: 1 %
BUN SERPL-MCNC: 8.5 MG/DL (ref 6–20)
CALCIUM SERPL-MCNC: 8.9 MG/DL (ref 8.6–10)
CHLORIDE SERPL-SCNC: 107 MMOL/L (ref 98–107)
CREAT SERPL-MCNC: 0.84 MG/DL (ref 0.67–1.17)
DEPRECATED HCO3 PLAS-SCNC: 22 MMOL/L (ref 22–29)
EOSINOPHIL # BLD AUTO: 0.4 10E3/UL (ref 0–0.7)
EOSINOPHIL NFR BLD AUTO: 5 %
ERYTHROCYTE [DISTWIDTH] IN BLOOD BY AUTOMATED COUNT: 13 % (ref 10–15)
GFR SERPL CREATININE-BSD FRML MDRD: >90 ML/MIN/1.73M2
GLUCOSE SERPL-MCNC: 104 MG/DL (ref 70–99)
HCT VFR BLD AUTO: 43.2 % (ref 40–53)
HGB BLD-MCNC: 14.9 G/DL (ref 13.3–17.7)
IMM GRANULOCYTES # BLD: 0 10E3/UL
IMM GRANULOCYTES NFR BLD: 0 %
LYMPHOCYTES # BLD AUTO: 2.9 10E3/UL (ref 0.8–5.3)
LYMPHOCYTES NFR BLD AUTO: 39 %
MCH RBC QN AUTO: 29.3 PG (ref 26.5–33)
MCHC RBC AUTO-ENTMCNC: 34.5 G/DL (ref 31.5–36.5)
MCV RBC AUTO: 85 FL (ref 78–100)
MONOCYTES # BLD AUTO: 0.5 10E3/UL (ref 0–1.3)
MONOCYTES NFR BLD AUTO: 6 %
NEUTROPHILS # BLD AUTO: 3.7 10E3/UL (ref 1.6–8.3)
NEUTROPHILS NFR BLD AUTO: 50 %
PLATELET # BLD AUTO: 219 10E3/UL (ref 150–450)
POTASSIUM SERPL-SCNC: 4.1 MMOL/L (ref 3.4–5.3)
RBC # BLD AUTO: 5.08 10E6/UL (ref 4.4–5.9)
SODIUM SERPL-SCNC: 138 MMOL/L (ref 136–145)
WBC # BLD AUTO: 7.5 10E3/UL (ref 4–11)

## 2023-07-27 PROCEDURE — 85025 COMPLETE CBC W/AUTO DIFF WBC: CPT

## 2023-07-27 PROCEDURE — 36415 COLL VENOUS BLD VENIPUNCTURE: CPT

## 2023-07-27 PROCEDURE — 80048 BASIC METABOLIC PNL TOTAL CA: CPT

## 2023-08-02 ENCOUNTER — DOCUMENTATION ONLY (OUTPATIENT)
Dept: UROLOGY | Facility: CLINIC | Age: 43
End: 2023-08-02
Payer: COMMERCIAL

## 2023-08-02 NOTE — PROGRESS NOTES
Type of Form Received: FMLA and Accommodation Request    Form Received (Date) 8/2/23   Form Filled out Yes, date 8/2/23   Placed in provider folder Yes       Accommodation Request  Received Completed forms Yes   Faxed Forms Faxed To: [employer]  Fax Number: [phone]   Sent to HIM (Date) 8/2/23       FMLA  Received Completed forms Yes   Faxed Forms Faxed To: -  Fax Number: -   Sent to HIM (Date) 8/2/23

## 2023-08-11 ENCOUNTER — ANESTHESIA (OUTPATIENT)
Dept: ANESTHESIOLOGY | Facility: AMBULATORY SURGERY CENTER | Age: 43
End: 2023-08-11

## 2023-08-11 ENCOUNTER — HOSPITAL ENCOUNTER (OUTPATIENT)
Facility: AMBULATORY SURGERY CENTER | Age: 43
Discharge: HOME | End: 2023-08-11
Attending: UROLOGY | Admitting: UROLOGY
Payer: COMMERCIAL

## 2023-08-11 VITALS
TEMPERATURE: 97.6 F | BODY MASS INDEX: 25.18 KG/M2 | RESPIRATION RATE: 15 BRPM | DIASTOLIC BLOOD PRESSURE: 80 MMHG | SYSTOLIC BLOOD PRESSURE: 141 MMHG | HEART RATE: 62 BPM | HEIGHT: 73 IN | OXYGEN SATURATION: 100 % | WEIGHT: 190 LBS

## 2023-08-11 DIAGNOSIS — N50.811 PAIN IN RIGHT TESTICLE: Primary | ICD-10-CM

## 2023-08-11 PROCEDURE — 54520 REMOVAL OF TESTIS: CPT | Mod: RT

## 2023-08-11 PROCEDURE — 54520 REMOVAL OF TESTIS: CPT | Mod: RT | Performed by: UROLOGY

## 2023-08-11 PROCEDURE — 88305 TISSUE EXAM BY PATHOLOGIST: CPT | Mod: 26 | Performed by: PATHOLOGY

## 2023-08-11 PROCEDURE — 88305 TISSUE EXAM BY PATHOLOGIST: CPT | Mod: TC | Performed by: UROLOGY

## 2023-08-11 RX ORDER — CEFAZOLIN SODIUM 2 G/50ML
2 SOLUTION INTRAVENOUS SEE ADMIN INSTRUCTIONS
Status: DISCONTINUED | OUTPATIENT
Start: 2023-08-11 | End: 2023-08-12 | Stop reason: HOSPADM

## 2023-08-11 RX ORDER — ACETAMINOPHEN 325 MG/1
975 TABLET ORAL ONCE
Status: DISCONTINUED | OUTPATIENT
Start: 2023-08-11 | End: 2023-08-12 | Stop reason: HOSPADM

## 2023-08-11 RX ORDER — LIDOCAINE 40 MG/G
CREAM TOPICAL
Status: DISCONTINUED | OUTPATIENT
Start: 2023-08-11 | End: 2023-08-12 | Stop reason: HOSPADM

## 2023-08-11 RX ORDER — ONDANSETRON 4 MG/1
4 TABLET, ORALLY DISINTEGRATING ORAL EVERY 30 MIN PRN
Status: DISCONTINUED | OUTPATIENT
Start: 2023-08-11 | End: 2023-08-12 | Stop reason: HOSPADM

## 2023-08-11 RX ORDER — FENTANYL CITRATE 50 UG/ML
25 INJECTION, SOLUTION INTRAMUSCULAR; INTRAVENOUS
Status: DISCONTINUED | OUTPATIENT
Start: 2023-08-11 | End: 2023-08-12 | Stop reason: HOSPADM

## 2023-08-11 RX ORDER — ACETAMINOPHEN 325 MG/1
975 TABLET ORAL ONCE
Status: COMPLETED | OUTPATIENT
Start: 2023-08-11 | End: 2023-08-11

## 2023-08-11 RX ORDER — ONDANSETRON 2 MG/ML
4 INJECTION INTRAMUSCULAR; INTRAVENOUS EVERY 30 MIN PRN
Status: DISCONTINUED | OUTPATIENT
Start: 2023-08-11 | End: 2023-08-12 | Stop reason: HOSPADM

## 2023-08-11 RX ORDER — OXYCODONE HYDROCHLORIDE 5 MG/1
10 TABLET ORAL
Status: DISCONTINUED | OUTPATIENT
Start: 2023-08-11 | End: 2023-08-12 | Stop reason: HOSPADM

## 2023-08-11 RX ORDER — SODIUM CHLORIDE, SODIUM LACTATE, POTASSIUM CHLORIDE, CALCIUM CHLORIDE 600; 310; 30; 20 MG/100ML; MG/100ML; MG/100ML; MG/100ML
INJECTION, SOLUTION INTRAVENOUS CONTINUOUS
Status: DISCONTINUED | OUTPATIENT
Start: 2023-08-11 | End: 2023-08-12 | Stop reason: HOSPADM

## 2023-08-11 RX ORDER — MELOXICAM 15 MG/1
7.5 TABLET ORAL DAILY
Qty: 7 TABLET | Refills: 0 | Status: SHIPPED | OUTPATIENT
Start: 2023-08-11

## 2023-08-11 RX ORDER — DEXAMETHASONE SODIUM PHOSPHATE 10 MG/ML
4 INJECTION, SOLUTION INTRAMUSCULAR; INTRAVENOUS
Status: DISCONTINUED | OUTPATIENT
Start: 2023-08-11 | End: 2023-08-12 | Stop reason: HOSPADM

## 2023-08-11 RX ORDER — PROPOFOL 10 MG/ML
INJECTION, EMULSION INTRAVENOUS CONTINUOUS PRN
Status: DISCONTINUED | OUTPATIENT
Start: 2023-08-11 | End: 2023-08-11

## 2023-08-11 RX ORDER — ALBUTEROL SULFATE 0.83 MG/ML
2.5 SOLUTION RESPIRATORY (INHALATION) EVERY 4 HOURS PRN
Status: DISCONTINUED | OUTPATIENT
Start: 2023-08-11 | End: 2023-08-12 | Stop reason: HOSPADM

## 2023-08-11 RX ORDER — HYDROMORPHONE HYDROCHLORIDE 1 MG/ML
0.2 INJECTION, SOLUTION INTRAMUSCULAR; INTRAVENOUS; SUBCUTANEOUS EVERY 5 MIN PRN
Status: DISCONTINUED | OUTPATIENT
Start: 2023-08-11 | End: 2023-08-12 | Stop reason: HOSPADM

## 2023-08-11 RX ORDER — FENTANYL CITRATE 50 UG/ML
50 INJECTION, SOLUTION INTRAMUSCULAR; INTRAVENOUS EVERY 5 MIN PRN
Status: DISCONTINUED | OUTPATIENT
Start: 2023-08-11 | End: 2023-08-12 | Stop reason: HOSPADM

## 2023-08-11 RX ORDER — PROPOFOL 10 MG/ML
INJECTION, EMULSION INTRAVENOUS PRN
Status: DISCONTINUED | OUTPATIENT
Start: 2023-08-11 | End: 2023-08-11

## 2023-08-11 RX ORDER — FENTANYL CITRATE 50 UG/ML
25 INJECTION, SOLUTION INTRAMUSCULAR; INTRAVENOUS EVERY 5 MIN PRN
Status: DISCONTINUED | OUTPATIENT
Start: 2023-08-11 | End: 2023-08-12 | Stop reason: HOSPADM

## 2023-08-11 RX ORDER — ONDANSETRON 2 MG/ML
INJECTION INTRAMUSCULAR; INTRAVENOUS PRN
Status: DISCONTINUED | OUTPATIENT
Start: 2023-08-11 | End: 2023-08-11

## 2023-08-11 RX ORDER — HYDRALAZINE HYDROCHLORIDE 20 MG/ML
2.5-5 INJECTION INTRAMUSCULAR; INTRAVENOUS EVERY 10 MIN PRN
Status: DISCONTINUED | OUTPATIENT
Start: 2023-08-11 | End: 2023-08-12 | Stop reason: HOSPADM

## 2023-08-11 RX ORDER — OXYCODONE HYDROCHLORIDE 5 MG/1
5 TABLET ORAL
Status: COMPLETED | OUTPATIENT
Start: 2023-08-11 | End: 2023-08-11

## 2023-08-11 RX ORDER — BUPIVACAINE HYDROCHLORIDE AND EPINEPHRINE 5; 5 MG/ML; UG/ML
INJECTION, SOLUTION PERINEURAL PRN
Status: DISCONTINUED | OUTPATIENT
Start: 2023-08-11 | End: 2023-08-11 | Stop reason: HOSPADM

## 2023-08-11 RX ORDER — FENTANYL CITRATE 50 UG/ML
INJECTION, SOLUTION INTRAMUSCULAR; INTRAVENOUS PRN
Status: DISCONTINUED | OUTPATIENT
Start: 2023-08-11 | End: 2023-08-11

## 2023-08-11 RX ORDER — DIMENHYDRINATE 50 MG/ML
25 INJECTION, SOLUTION INTRAMUSCULAR; INTRAVENOUS
Status: DISCONTINUED | OUTPATIENT
Start: 2023-08-11 | End: 2023-08-12 | Stop reason: HOSPADM

## 2023-08-11 RX ORDER — CEFAZOLIN SODIUM 2 G/50ML
2 SOLUTION INTRAVENOUS
Status: COMPLETED | OUTPATIENT
Start: 2023-08-11 | End: 2023-08-11

## 2023-08-11 RX ORDER — DEXAMETHASONE SODIUM PHOSPHATE 4 MG/ML
INJECTION, SOLUTION INTRA-ARTICULAR; INTRALESIONAL; INTRAMUSCULAR; INTRAVENOUS; SOFT TISSUE PRN
Status: DISCONTINUED | OUTPATIENT
Start: 2023-08-11 | End: 2023-08-11

## 2023-08-11 RX ORDER — MEPERIDINE HYDROCHLORIDE 25 MG/ML
12.5 INJECTION INTRAMUSCULAR; INTRAVENOUS; SUBCUTANEOUS EVERY 5 MIN PRN
Status: DISCONTINUED | OUTPATIENT
Start: 2023-08-11 | End: 2023-08-12 | Stop reason: HOSPADM

## 2023-08-11 RX ORDER — HALOPERIDOL 5 MG/ML
1 INJECTION INTRAMUSCULAR
Status: DISCONTINUED | OUTPATIENT
Start: 2023-08-11 | End: 2023-08-12 | Stop reason: HOSPADM

## 2023-08-11 RX ORDER — LIDOCAINE HYDROCHLORIDE 20 MG/ML
INJECTION, SOLUTION INFILTRATION; PERINEURAL PRN
Status: DISCONTINUED | OUTPATIENT
Start: 2023-08-11 | End: 2023-08-11

## 2023-08-11 RX ORDER — KETOROLAC TROMETHAMINE 30 MG/ML
15 INJECTION, SOLUTION INTRAMUSCULAR; INTRAVENOUS
Status: DISCONTINUED | OUTPATIENT
Start: 2023-08-11 | End: 2023-08-12 | Stop reason: HOSPADM

## 2023-08-11 RX ORDER — LABETALOL HYDROCHLORIDE 5 MG/ML
10 INJECTION, SOLUTION INTRAVENOUS
Status: DISCONTINUED | OUTPATIENT
Start: 2023-08-11 | End: 2023-08-12 | Stop reason: HOSPADM

## 2023-08-11 RX ORDER — LORAZEPAM 2 MG/ML
.5-1 INJECTION INTRAMUSCULAR
Status: DISCONTINUED | OUTPATIENT
Start: 2023-08-11 | End: 2023-08-12 | Stop reason: HOSPADM

## 2023-08-11 RX ORDER — HYDROXYZINE HYDROCHLORIDE 25 MG/1
25 TABLET, FILM COATED ORAL EVERY 6 HOURS PRN
Status: DISCONTINUED | OUTPATIENT
Start: 2023-08-11 | End: 2023-08-12 | Stop reason: HOSPADM

## 2023-08-11 RX ORDER — HYDROMORPHONE HYDROCHLORIDE 1 MG/ML
0.4 INJECTION, SOLUTION INTRAMUSCULAR; INTRAVENOUS; SUBCUTANEOUS EVERY 5 MIN PRN
Status: DISCONTINUED | OUTPATIENT
Start: 2023-08-11 | End: 2023-08-12 | Stop reason: HOSPADM

## 2023-08-11 RX ADMIN — FENTANYL CITRATE 100 MCG: 50 INJECTION, SOLUTION INTRAMUSCULAR; INTRAVENOUS at 07:24

## 2023-08-11 RX ADMIN — SODIUM CHLORIDE, SODIUM LACTATE, POTASSIUM CHLORIDE, CALCIUM CHLORIDE: 600; 310; 30; 20 INJECTION, SOLUTION INTRAVENOUS at 06:39

## 2023-08-11 RX ADMIN — CEFAZOLIN SODIUM 2 G: 2 SOLUTION INTRAVENOUS at 07:13

## 2023-08-11 RX ADMIN — ONDANSETRON 4 MG: 2 INJECTION INTRAMUSCULAR; INTRAVENOUS at 07:50

## 2023-08-11 RX ADMIN — PROPOFOL 150 MCG/KG/MIN: 10 INJECTION, EMULSION INTRAVENOUS at 07:24

## 2023-08-11 RX ADMIN — FENTANYL CITRATE 50 MCG: 50 INJECTION, SOLUTION INTRAMUSCULAR; INTRAVENOUS at 08:28

## 2023-08-11 RX ADMIN — Medication 50 MG: at 07:24

## 2023-08-11 RX ADMIN — DEXAMETHASONE SODIUM PHOSPHATE 4 MG: 4 INJECTION, SOLUTION INTRA-ARTICULAR; INTRALESIONAL; INTRAMUSCULAR; INTRAVENOUS; SOFT TISSUE at 07:24

## 2023-08-11 RX ADMIN — Medication 0.5 MG: at 07:38

## 2023-08-11 RX ADMIN — PROPOFOL 250 MG: 10 INJECTION, EMULSION INTRAVENOUS at 07:24

## 2023-08-11 RX ADMIN — OXYCODONE HYDROCHLORIDE 5 MG: 5 TABLET ORAL at 08:28

## 2023-08-11 RX ADMIN — LIDOCAINE HYDROCHLORIDE 100 MG: 20 INJECTION, SOLUTION INFILTRATION; PERINEURAL at 07:24

## 2023-08-11 RX ADMIN — ACETAMINOPHEN 975 MG: 325 TABLET ORAL at 06:33

## 2023-08-11 ASSESSMENT — ENCOUNTER SYMPTOMS: SEIZURES: 0

## 2023-08-11 ASSESSMENT — LIFESTYLE VARIABLES: TOBACCO_USE: 1

## 2023-08-11 NOTE — ANESTHESIA POSTPROCEDURE EVALUATION
Patient: Ilan Hinds    Procedure: Procedure(s):  Right simple orchiectomy       Anesthesia Type:  General    Note:  Disposition: Outpatient   Postop Pain Control: Uneventful            Sign Out: Well controlled pain   PONV: No   Neuro/Psych: Uneventful            Sign Out: Acceptable/Baseline neuro status   Airway/Respiratory: Uneventful            Sign Out: Acceptable/Baseline resp. status   CV/Hemodynamics: Uneventful            Sign Out: Acceptable CV status; No obvious hypovolemia; No obvious fluid overload   Other NRE: NONE   DID A NON-ROUTINE EVENT OCCUR? No           Last vitals:  Vitals Value Taken Time   /93 08/11/23 0837   Temp 36.4  C (97.5  F) 08/11/23 0837   Pulse 62 08/11/23 0837   Resp 10 08/11/23 0837   SpO2 97 % 08/11/23 0837       Electronically Signed By: Fidencio Hernandez MD  August 11, 2023  9:36 AM

## 2023-08-11 NOTE — ANESTHESIA PREPROCEDURE EVALUATION
Pre-Operative H & P     CC:  Preoperative exam to assess for increased cardiopulmonary risk while undergoing surgery and anesthesia.    Date of Encounter: 7/26/2023  Primary Care Physician:  Stoney Martinez     Reason for visit:   No diagnosis found.      HPI  Ilan Hinds is a 42 year old male who presents for pre-operative H & P in preparation for  Procedure Information       Case: [id number] Date/Time: 08/11/23 0715    Procedures:       Laparoscopic, Possible Open - Right Orchiectomy; Orchiectomy Inguinal (psb) - Right (Right: Groin)      Orchiectomy scrotal (Right: Scrotum)    Anesthesia type: General    Diagnosis:       Undescended right testicle [Q53.10]      Pain in right testicle [N50.811]    Pre-op diagnosis:       Undescended right testicle [Q53.10]      Pain in right testicle [N50.811]    Location: [facility]  / [facility]    Providers: Bret Cool MD              Mr. Hinds has a 4 month history of painful right testis in the inguinal canal.  CT abdomen/pelvis from 4/26/23 shows normal looking testis at or above the external inguinal ring. It is palpable on exam but is very tender to palpation. Pt is bothered constantly with discomfort in the right testis.  He has 3 children, conceived easily, desires no future fertility.  He would like the testis removed due to severe discomfort.  He now presents for the above procedure.    PMH is otherwise unremarkable.    History is obtained from the patient and chart review    Hx of abnormal bleeding or anti-platelet use: denies      Past Medical History  Past Medical History:   Diagnosis Date    Undescended right testicle     Varicose vein of leg     Rt leg       Past Surgical History  History reviewed. No pertinent surgical history.    Prior to Admission Medications  Current Outpatient Medications   Medication Sig Dispense Refill    Acetaminophen (TYLENOL PO) Take by mouth every 6 hours as needed      IBUPROFEN PO   (Patient not taking: Reported on 2023)      NO ACTIVE MEDICATIONS          Allergies  No Known Allergies    Social History  Social History     Socioeconomic History    Marital status:      Spouse name: Not on file    Number of children: 0    Years of education: Not on file    Highest education level: Not on file   Occupational History    Not on file   Tobacco Use    Smoking status: Former     Types: Cigarettes     Quit date: 2023     Years since quittin.1    Smokeless tobacco: Not on file   Substance and Sexual Activity    Alcohol use: Not Currently     Alcohol/week: 3.3 - 4.2 standard drinks of alcohol     Types: 4 - 5 drink(s) per week    Drug use: No    Sexual activity: Yes     Partners: Female   Other Topics Concern    Not on file   Social History Narrative    Not on file     Social Determinants of Health     Financial Resource Strain: Not on file   Food Insecurity: Not on file   Transportation Needs: Not on file   Physical Activity: Not on file   Stress: Not on file   Social Connections: Not on file   Intimate Partner Violence: Not on file   Housing Stability: Not on file       Family History  Family History   Problem Relation Age of Onset    Anesthesia Reaction No family hx of     Deep Vein Thrombosis (DVT) No family hx of        Review of Systems  The complete review of systems is negative other than noted in the HPI or here.     Anesthesia Evaluation   Pt has not had prior anesthetic         ROS/MED HX  ENT/Pulmonary:     (+)                tobacco use, Past use,                   (-) asthma and sleep apnea   Neurologic:  - neg neurologic ROS  (-) no seizures and no CVA   Cardiovascular:  - neg cardiovascular ROS     METS/Exercise Tolerance: >4 METS    Hematologic:  - neg hematologic  ROS  (-) history of blood clots and history of blood transfusion   Musculoskeletal:  - neg musculoskeletal ROS     GI/Hepatic:  - neg GI/hepatic ROS  (-) GERD and liver disease   Renal/Genitourinary:  Comment: Undescended right testicle     (-) renal disease   Endo:  - neg endo ROS  (-) Type II DM   Psychiatric/Substance Use:  - neg psychiatric ROS     Infectious Disease:  - neg infectious disease ROS     Malignancy:  - neg malignancy ROS     Other:  - neg other ROS          Virtual visit -  No vitals were obtained    Physical Exam  Constitutional: Awake, alert, cooperative, no apparent distress, and appears stated age.  HENT: Normocephalic  Respiratory: non labored breathing   Neurologic: Awake, alert, oriented to name, place and time.   Neuropsychiatric: Calm, cooperative. Normal affect.      Prior Labs/Diagnostic Studies   All labs and imaging personally reviewed       The patient's records and results personally reviewed by this provider.     Preop labs ordered:  BMP, CBC      Assessment      Ilan Hinds is a 42 year old male seen as a PAC referral for risk assessment and optimization for anesthesia.    Plan/Recommendations  Pt will be optimized for the proposed procedure.  See below for details on the assessment, risk, and preoperative recommendations    NEUROLOGY  - No history of TIA, CVA or seizure    -Post Op delirium risk factors:  No risk identified    ENT  - No current airway concerns.  Will need to be reassessed day of surgery.  Mallampati: Unable to assess  TM: Unable to assess    CARDIAC  - No history of CAD, Hypertension and Afib  - METS (Metabolic Equivalents)  Patient performs 4 or more METS exercise without symptoms            Total Score: 0      RCRI-Very low risk: Class 1 0.4% complication rate            Total Score: 0        PULMONARY  JEREMÍAS Low Risk            Total Score: 1    JEREMÍAS: Male      - Denies asthma or inhaler use  - Tobacco History      History   Smoking Status    Former    Types: Cigarettes    Quit date: 6/19/2023   Smokeless Tobacco    Not on file       GI  - Denies GERD  PONV Medium Risk  Total Score: 2           1 AN PONV: Patient is not a current smoker    1 AN PONV: Intended  "Post Op Opioids        /RENAL  - Undescended right testicle, symptomatic      ENDOCRINE    - BMI: Estimated body mass index is 25.07 kg/m  as calculated from the following:    Height as of this encounter: 1.854 m (6' 1\").    Weight as of this encounter: 86.2 kg (190 lb).  Healthy Weight (BMI 18.5-24.9)  - No history of Diabetes Mellitus    HEME  VTE Low Risk 0.5%            Total Score: 2    VTE: Male      - No history of abnormal bleeding or antiplatelet use.      The patient is optimized for their procedure. AVS with information on surgery time/arrival time, meds and NPO status given by nursing staff. No further diagnostic testing indicated.    Please refer to the physical examination documented by the anesthesiologist in the anesthesia record on the day of surgery.    Video-Visit Details    Type of service:  Video Visit    Provider received verbal consent for a Video Visit from the patient? Yes     Originating Location (pt. Location): Home    Distant Location (provider location):  Off-site  Mode of Communication:  Video Conference via [employer]  On the day of service:     Prep time: 11 minutes  Visit time: 8 minutes  Documentation time: 9 minutes  ------------------------------------------  Total time: 28 minutes      Anabel Caraballo PA-C  Preoperative Assessment Center  [facility]  Phone: [phone]  Fax: [phone]  Physical Exam    Airway        Mallampati: II   TM distance: > 3 FB   Neck ROM: full   Mouth opening: > 3 cm    Respiratory Devices and Support         Dental       (+) Completely normal teeth      Cardiovascular   cardiovascular exam normal          Pulmonary   pulmonary exam normal                Anesthesia Plan    ASA Status:  1    NPO Status:  NPO Appropriate    Anesthesia Type: General.     - Airway: ETT   Induction: Intravenous, Propofol.   Maintenance: Balanced.        Consents    Anesthesia Plan(s) and associated risks, benefits, and " realistic alternatives discussed. Questions answered and patient/representative(s) expressed understanding.     - Discussed:     - Discussed with:  Patient      - Extended Intubation/Ventilatory Support Discussed: No.      - Patient is DNR/DNI Status: No          Postoperative Care    Pain management: IV analgesics, Oral pain medications, Multi-modal analgesia.   PONV prophylaxis: Dexamethasone or Solumedrol, Ondansetron (or other 5HT-3), Background Propofol Infusion     Comments:

## 2023-08-11 NOTE — ANESTHESIA CARE TRANSFER NOTE
Patient: Ilan Hinds    Procedure: Procedure(s):  Right simple orchiectomy       Diagnosis: Undescended right testicle [Q53.10]  Pain in right testicle [N50.811]  Diagnosis Additional Information: No value filed.    Anesthesia Type:   General     Note:    Oropharynx: oropharynx clear of all foreign objects  Level of Consciousness: drowsy  Oxygen Supplementation: nasal cannula  Level of Supplemental Oxygen (L/min / FiO2): 4  Independent Airway: airway patency satisfactory and stable  Dentition: dentition unchanged  Vital Signs Stable: post-procedure vital signs reviewed and stable  Report to RN Given: handoff report given  Patient transferred to: PACU    Handoff Report: Identifed the Patient, Identified the Reponsible Provider, Reviewed the pertinent medical history, Discussed the surgical course, Reviewed Intra-OP anesthesia mangement and issues during anesthesia, Set expectations for post-procedure period and Allowed opportunity for questions and acknowledgement of understanding      Vitals:  Vitals Value Taken Time   /79 08/11/23 0815   Temp 36.4  C (97.5  F) 08/11/23 0815   Pulse 74 08/11/23 0816   Resp 22 08/11/23 0816   SpO2 99 % 08/11/23 0816   Vitals shown include unvalidated device data.    Electronically Signed By: SORAYA Haywood CRNA  August 11, 2023  8:17 AM

## 2023-08-11 NOTE — OP NOTE
PREOPERATIVE DIAGNOSIS:  Ectopic right testicle  POSTOPERATIVE DIAGNOSIS: Same    PROCEDURES PERFORMED:   1. Right simple orchiectomy     STAFF SURGEON: Bret Cool MD  RESIDENT(S):  Nazario Hodgson MD; Kourtney Griffin MD   ANESTHESIA:  GETA    ESTIMATED BLOOD LOSS: 5 mL.   IV FLUIDS: see dictated anesthesia record  COMPLICATIONS: None.   SPECIMEN:    Right  testicle and spermatic cord up to the level of the exteral ring    SIGNIFICANT FINDINGS:   Ligation of the cord at the level approximately of the external ring; testicle excised en block without any violation of the scrotum.    BRIEF OPERATIVE INDICATIONS: Mr. Hinds is a 43 y/o m w/ an ectopic right testicle palpable at the right inguinal region. This has been causing pain, and he would like this removed. As such he agreed with the above described procedure.      DESCRIPTION OF PROCEDURE: After full informed voluntary consent was obtained, the patient was transported to the operating room, placed supine on the table. After adequate anesthesia was induced, they were prepped and draped in the usual sterile fashion. A timeout was taken to confirm correct patient, procedure and laterality    Under anesthesia, we were able to palpate testicle just outside of inguinal ring. A right subinguinal incision, about 3-4cm was made sharply. We used electrocautery to dissect through avery's fascia where the right testicle was seen just distal to the external ring. The gubernaculum was transected with electrocautery and the testicle and cord were bluntly dissected from neighboring attachments until the cord was isolated.     Attention was then turned to the spermatic cord, which was divided into two segments and separately clamped and divided as high as possible at about the level of the external ring. Each was then stick tied with 2-0 silk suture and then the base was tied with a 2-0 silk suture as well.     The wound was then irrigated.. Hemostasis appeared excellent.  Tien's was then closed with a running 3-0 vicryl suture followed by the skin with a 4-0 Monocryl. The wound was dressed with dermabond.     Patient tolerated the procedure well. No apparent complications. The patient was transported to the postanesthesia care unit in stable condition    Nazario Hodgson  PGY-4  [phone]      As attending surgeon, I, Bret Cool MD, was present for the entire procedure.

## 2023-08-14 LAB
PATH REPORT.COMMENTS IMP SPEC: NORMAL
PATH REPORT.COMMENTS IMP SPEC: NORMAL
PATH REPORT.FINAL DX SPEC: NORMAL
PATH REPORT.GROSS SPEC: NORMAL
PATH REPORT.MICROSCOPIC SPEC OTHER STN: NORMAL
PATH REPORT.RELEVANT HX SPEC: NORMAL
PHOTO IMAGE: NORMAL

## 2023-08-16 ENCOUNTER — PRE VISIT (OUTPATIENT)
Dept: UROLOGY | Facility: CLINIC | Age: 43
End: 2023-08-16
Payer: COMMERCIAL

## 2023-08-16 NOTE — TELEPHONE ENCOUNTER
Reason for visit: post op      Dx/Hx/Sx: right simple orchiectomy     Records/imaging/labs/orders: in epic    At Rooming: virtual visit

## 2023-08-23 ENCOUNTER — VIRTUAL VISIT (OUTPATIENT)
Dept: UROLOGY | Facility: CLINIC | Age: 43
End: 2023-08-23
Payer: COMMERCIAL

## 2023-08-23 DIAGNOSIS — Q53.10 UNDESCENDED RIGHT TESTICLE: Primary | ICD-10-CM

## 2023-08-23 PROCEDURE — 99024 POSTOP FOLLOW-UP VISIT: CPT | Mod: VID | Performed by: UROLOGY

## 2023-08-23 ASSESSMENT — PAIN SCALES - GENERAL: PAINLEVEL: MILD PAIN (2)

## 2023-08-23 NOTE — PROGRESS NOTES
Virtual Visit Details    Type of service:  Video Visit     Originating Location (pt. Location): Home    Distant Location (provider location):  Off-site  Platform used for Video Visit: [employer]   130-135p    Ilan Hinds is a 42 year old male with undescended right testicle just external to inguinal ring.  He opted for orchiectomy.  We did right orchiectomy without complication using subinguinal incision.  Specimen was benign atrophic testicle on pathology.  He feels well and very happy with outcome.  No other questions today.    Vital signs reviewed    Exam:           Constitutional - No apparent distress. Patient of stated age.               Eyes - no redness or discharge.              Respiratory - no cough. no labored breathing              Musculoskeletal - full range of motion in all extremities              Skin - no visible skin discoloration or lesions              Neurological - no tremors              Psychiatric - no anxiety, alert & oriented                  A/P   Excellent outcome after right orchiectomy    Followup MARIBELL Cool MD

## 2023-08-23 NOTE — LETTER
8/23/2023       RE: Ilan Hinds  [address]  [place] [address]     Dear Colleague,    Thank you for referring your patient, Ilan Hinds, to the [facility] [place] at [facility]. Please see a copy of my visit note below.    Virtual Visit Details    Type of service:  Video Visit     Originating Location (pt. Location): Home    Distant Location (provider location):  Off-site  Platform used for Video Visit: [employer]   130-135p    Ilan Hinds is a 42 year old male with undescended right testicle just external to inguinal ring.  He opted for orchiectomy.  We did right orchiectomy without complication using subinguinal incision.  Specimen was benign atrophic testicle on pathology.  He feels well and very happy with outcome.  No other questions today.    Vital signs reviewed    Exam:           Constitutional - No apparent distress. Patient of stated age.               Eyes - no redness or discharge.              Respiratory - no cough. no labored breathing              Musculoskeletal - full range of motion in all extremities              Skin - no visible skin discoloration or lesions              Neurological - no tremors              Psychiatric - no anxiety, alert & oriented                  A/P   Excellent outcome after right orchiectomy    Followup MARIBELL Cool MD

## 2023-08-23 NOTE — NURSING NOTE
Is the patient currently in the state of [place]? YES    Visit mode:VIDEO    If the visit is dropped, the patient can be reconnected by: VIDEO VISIT: Text to cell phone:   Telephone Information:   Mobile [phone]       Will anyone else be joining the visit? NO  (If patient encounters technical issues they should call [phone]    How would you like to obtain your AVS? MyChart    Are changes needed to the allergy or medication list? No    Reason for visit: Follow Up    Trena VILLEDA

## 2024-07-06 ENCOUNTER — HEALTH MAINTENANCE LETTER (OUTPATIENT)
Age: 44
End: 2024-07-06

## 2025-07-13 ENCOUNTER — HEALTH MAINTENANCE LETTER (OUTPATIENT)
Age: 45
End: 2025-07-13

## (undated) DEVICE — ENDO TROCAR FIRST ENTRY KII FIOS Z-THRD 12X100MM CTF73

## (undated) DEVICE — SYSTEM LAPAROVUE VISIBILITY LAPVUE10

## (undated) DEVICE — SUCTION TIP YANKAUER W/O VENT K86

## (undated) DEVICE — SU MONOCRYL 4-0 PS-2 27" UND Y426H

## (undated) DEVICE — PACK LAP CHOLE CUSTOM ASC

## (undated) DEVICE — RX BACITRACIN OINTMENT 14G 0.5OZ 45802-060-01

## (undated) DEVICE — SUPPORTER ATHLETIC LG LATEX 202636

## (undated) DEVICE — BLADE CLIPPER SGL USE 9680

## (undated) DEVICE — DRAPE LAP W/ARMBOARD 29410

## (undated) DEVICE — SOL ADH LIQUID BENZOIN SWAB 0.6ML C1544

## (undated) DEVICE — PACK MINOR CUSTOM ASC

## (undated) DEVICE — PAD CHUX UNDERPAD 30X36" P3036C

## (undated) DEVICE — TUBING INSUFFLATION W/FILTER CPC TO LUER 620-030-301

## (undated) DEVICE — NDL INSUFFLATION 13GA 120MM C2201

## (undated) DEVICE — ENDO TROCAR FIRST ENTRY KII FIOS Z-THRD 05X100MM CTF03

## (undated) DEVICE — SPONGE RAY-TEC 4X8" 7318

## (undated) DEVICE — ESU LIGASURE LAPAROSCOPIC BLUNT TIP SEALER 5MMX37CM LF1837

## (undated) DEVICE — SOL NACL 0.9% IRRIG 500ML BOTTLE 2F7123

## (undated) DEVICE — DRSG KERLIX FLUFFS X5

## (undated) DEVICE — SU SILK 0 SH 30" K834H

## (undated) DEVICE — ESU GROUND PAD ADULT W/CORD E7507

## (undated) DEVICE — LINEN TOWEL PACK X5 5464

## (undated) DEVICE — NDL INSUFFLATION 13GA 150MM C2202

## (undated) DEVICE — SU VICRYL 3-0 SH 27" UND J416H

## (undated) DEVICE — BLADE KNIFE SURG 10 371110

## (undated) DEVICE — GLOVE BIOGEL PI MICRO SZ 7.5 48575

## (undated) DEVICE — SUCTION MANIFOLD NEPTUNE 2 SYS 4 PORT 0702-020-000

## (undated) DEVICE — PREP CHLORAPREP 26ML TINTED ORANGE  260815

## (undated) DEVICE — DRSG PRIMAPORE 02X3" 7133

## (undated) RX ORDER — PROPOFOL 10 MG/ML
INJECTION, EMULSION INTRAVENOUS
Status: DISPENSED
Start: 2023-08-11

## (undated) RX ORDER — FENTANYL CITRATE 50 UG/ML
INJECTION, SOLUTION INTRAMUSCULAR; INTRAVENOUS
Status: DISPENSED
Start: 2023-08-11

## (undated) RX ORDER — CEFAZOLIN SODIUM 2 G/50ML
SOLUTION INTRAVENOUS
Status: DISPENSED
Start: 2023-08-11

## (undated) RX ORDER — OXYCODONE HYDROCHLORIDE 5 MG/1
TABLET ORAL
Status: DISPENSED
Start: 2023-08-11

## (undated) RX ORDER — HYDROMORPHONE HYDROCHLORIDE 1 MG/ML
INJECTION, SOLUTION INTRAMUSCULAR; INTRAVENOUS; SUBCUTANEOUS
Status: DISPENSED
Start: 2023-08-11

## (undated) RX ORDER — BUPIVACAINE HYDROCHLORIDE AND EPINEPHRINE 5; 5 MG/ML; UG/ML
INJECTION, SOLUTION EPIDURAL; INTRACAUDAL; PERINEURAL
Status: DISPENSED
Start: 2023-08-11

## (undated) RX ORDER — ONDANSETRON 2 MG/ML
INJECTION INTRAMUSCULAR; INTRAVENOUS
Status: DISPENSED
Start: 2023-08-11

## (undated) RX ORDER — ACETAMINOPHEN 325 MG/1
TABLET ORAL
Status: DISPENSED
Start: 2023-08-11